# Patient Record
Sex: MALE
[De-identification: names, ages, dates, MRNs, and addresses within clinical notes are randomized per-mention and may not be internally consistent; named-entity substitution may affect disease eponyms.]

---

## 2017-09-20 ENCOUNTER — OTHER (OUTPATIENT)
Age: 67
End: 2017-09-20

## 2017-10-09 ENCOUNTER — LABORATORY RESULT (OUTPATIENT)
Age: 67
End: 2017-10-09

## 2017-10-10 ENCOUNTER — RX RENEWAL (OUTPATIENT)
Age: 67
End: 2017-10-10

## 2017-10-11 ENCOUNTER — CHART COPY (OUTPATIENT)
Age: 67
End: 2017-10-11

## 2017-10-11 LAB
ALBUMIN SERPL ELPH-MCNC: 4.1 G/DL
ALP BLD-CCNC: 50 U/L
ALT SERPL-CCNC: 27 U/L
ANION GAP SERPL CALC-SCNC: 17 MMOL/L
AST SERPL-CCNC: 22 U/L
BASOPHILS # BLD AUTO: 0 K/UL
BASOPHILS NFR BLD AUTO: 0 %
BILIRUB SERPL-MCNC: 0.4 MG/DL
BUN SERPL-MCNC: 20 MG/DL
CALCIUM SERPL-MCNC: 9.1 MG/DL
CGA SERPL-MCNC: 492 NG/ML
CHLORIDE SERPL-SCNC: 100 MMOL/L
CHOLEST SERPL-MCNC: 141 MG/DL
CHOLEST/HDLC SERPL: 3.4 RATIO
CO2 SERPL-SCNC: 26 MMOL/L
CREAT SERPL-MCNC: 1.13 MG/DL
EOSINOPHIL # BLD AUTO: 0.05 K/UL
EOSINOPHIL NFR BLD AUTO: 1 %
FERRITIN SERPL-MCNC: 8 NG/ML
FOLATE SERPL-MCNC: >20 NG/ML
GLUCOSE SERPL-MCNC: 110 MG/DL
HCT VFR BLD CALC: 38.2 %
HDLC SERPL-MCNC: 42 MG/DL
HGB BLD-MCNC: 11.7 G/DL
IRON SATN MFR SERPL: 6 %
IRON SERPL-MCNC: 22 UG/DL
LDLC SERPL CALC-MCNC: 75 MG/DL
LYMPHOCYTES # BLD AUTO: 2.01 K/UL
LYMPHOCYTES NFR BLD AUTO: 41 %
MAN DIFF?: NORMAL
MCHC RBC-ENTMCNC: 20.7 PG
MCHC RBC-ENTMCNC: 30.6 GM/DL
MCV RBC AUTO: 67.6 FL
MONOCYTES # BLD AUTO: 0.49 K/UL
MONOCYTES NFR BLD AUTO: 10 %
NEUTROPHILS # BLD AUTO: 2.36 K/UL
NEUTROPHILS NFR BLD AUTO: 48 %
PLATELET # BLD AUTO: 181 K/UL
POTASSIUM SERPL-SCNC: 4.2 MMOL/L
PROT SERPL-MCNC: 6.6 G/DL
RBC # BLD: 5.65 M/UL
RBC # FLD: 19.1 %
SODIUM SERPL-SCNC: 143 MMOL/L
TIBC SERPL-MCNC: 389 UG/DL
TRIGL SERPL-MCNC: 121 MG/DL
UIBC SERPL-MCNC: 367 UG/DL
VIT B12 SERPL-MCNC: 471 PG/ML
WBC # FLD AUTO: 4.91 K/UL

## 2017-11-20 ENCOUNTER — LABORATORY RESULT (OUTPATIENT)
Age: 67
End: 2017-11-20

## 2017-11-20 ENCOUNTER — APPOINTMENT (OUTPATIENT)
Dept: GASTROENTEROLOGY | Facility: CLINIC | Age: 67
End: 2017-11-20
Payer: MEDICARE

## 2017-11-20 VITALS
HEIGHT: 75 IN | BODY MASS INDEX: 31.46 KG/M2 | WEIGHT: 253 LBS | DIASTOLIC BLOOD PRESSURE: 76 MMHG | SYSTOLIC BLOOD PRESSURE: 116 MMHG | TEMPERATURE: 98.7 F

## 2017-11-20 PROCEDURE — 99213 OFFICE O/P EST LOW 20 MIN: CPT

## 2017-11-20 RX ORDER — CARVEDILOL 12.5 MG/1
12.5 TABLET, FILM COATED ORAL
Qty: 180 | Refills: 0 | Status: ACTIVE | COMMUNITY
Start: 2017-08-05

## 2017-11-20 RX ORDER — EPLERENONE 50 MG/1
50 TABLET, COATED ORAL
Qty: 90 | Refills: 0 | Status: ACTIVE | COMMUNITY
Start: 2017-07-14

## 2017-11-20 RX ORDER — SILODOSIN 8 MG/1
8 CAPSULE ORAL
Qty: 90 | Refills: 0 | Status: ACTIVE | COMMUNITY
Start: 2017-07-11

## 2017-11-21 LAB
ALBUMIN SERPL ELPH-MCNC: 4.4 G/DL
ALP BLD-CCNC: 52 U/L
ALT SERPL-CCNC: 34 U/L
BASOPHILS # BLD AUTO: 0.2 K/UL
BASOPHILS NFR BLD AUTO: 2.8 %
BUN SERPL-MCNC: 20 MG/DL
CHLORIDE SERPL-SCNC: 98 MMOL/L
CHOLEST SERPL-MCNC: 168 MG/DL
CO2 SERPL-SCNC: 27 MMOL/L
CREAT SERPL-MCNC: 1.15 MG/DL
ENDOMYSIUM IGA SER QL: NEGATIVE
ENDOMYSIUM IGA TITR SER: NORMAL
EOSINOPHIL # BLD AUTO: 0 K/UL
EOSINOPHIL NFR BLD AUTO: 0
FERRITIN SERPL-MCNC: 58 NG/ML
FOLATE SERPL-MCNC: >20 NG/ML
GLIADIN IGA SER QL: 5.5 UNITS
GLIADIN IGG SER QL: 13.1 UNITS
GLIADIN PEPTIDE IGA SER-ACNC: NEGATIVE
GLIADIN PEPTIDE IGG SER-ACNC: NEGATIVE
GLUCOSE SERPL-MCNC: 115 MG/DL
HCT VFR BLD CALC: 46.6 %
HDLC SERPL-MCNC: 37 MG/DL
HGB BLD-MCNC: 14.9 G/DL
IGA SER QL IEP: 233 MG/DL
IRON SATN MFR SERPL: 29 %
IRON SERPL-MCNC: 110 UG/DL
LDH SERPL-CCNC: 203 U/L
LDLC SERPL DIRECT ASSAY-MCNC: 90 MG/DL
LYMPHOCYTES # BLD AUTO: 2.13 K/UL
LYMPHOCYTES NFR BLD AUTO: 29.6 %
MAGNESIUM SERPL-MCNC: 1.9 MG/DL
MAN DIFF?: NORMAL
MCHC RBC-ENTMCNC: 24.6 PG
MCHC RBC-ENTMCNC: 32 GM/DL
MCV RBC AUTO: 77 FL
MONOCYTES # BLD AUTO: 0.4 K/UL
MONOCYTES NFR BLD AUTO: 5.6 %
NEUTROPHILS # BLD AUTO: 4.26 K/UL
NEUTROPHILS NFR BLD AUTO: 59.2 %
PHOSPHATE SERPL-MCNC: 3.6 MG/DL
PLATELET # BLD AUTO: 180 K/UL
POTASSIUM SERPL-SCNC: 3.7 MMOL/L
RBC # BLD: 6.05 M/UL
RBC # FLD: 25.1 %
SODIUM SERPL-SCNC: 139 MMOL/L
TIBC SERPL-MCNC: 384 UG/DL
TRIGL SERPL-MCNC: 286 MG/DL
TTG IGA SER IA-ACNC: 5 UNITS
TTG IGA SER-ACNC: NEGATIVE
TTG IGG SER IA-ACNC: <5 UNITS
TTG IGG SER IA-ACNC: NEGATIVE
UIBC SERPL-MCNC: 274 UG/DL
VIT B12 SERPL-MCNC: 571 PG/ML
WBC # FLD AUTO: 7.2 K/UL

## 2017-11-29 ENCOUNTER — CHART COPY (OUTPATIENT)
Age: 67
End: 2017-11-29

## 2017-11-29 LAB
CGA SERPL-MCNC: 546 NG/ML
SEROTONIN SERUM: 73 NG/ML

## 2018-10-10 ENCOUNTER — APPOINTMENT (OUTPATIENT)
Dept: GASTROENTEROLOGY | Facility: CLINIC | Age: 68
End: 2018-10-10
Payer: MEDICARE

## 2018-10-10 VITALS
BODY MASS INDEX: 30.46 KG/M2 | OXYGEN SATURATION: 97 % | HEART RATE: 84 BPM | DIASTOLIC BLOOD PRESSURE: 100 MMHG | TEMPERATURE: 98.4 F | WEIGHT: 245 LBS | HEIGHT: 75 IN | SYSTOLIC BLOOD PRESSURE: 140 MMHG

## 2018-10-10 DIAGNOSIS — D50.8 OTHER IRON DEFICIENCY ANEMIAS: ICD-10-CM

## 2018-10-10 PROCEDURE — 99214 OFFICE O/P EST MOD 30 MIN: CPT

## 2018-10-11 LAB
ALBUMIN SERPL ELPH-MCNC: 4.3 G/DL
ALP BLD-CCNC: 63 U/L
ALT SERPL-CCNC: 37 U/L
ANION GAP SERPL CALC-SCNC: 12 MMOL/L
AST SERPL-CCNC: 28 U/L
BILIRUB SERPL-MCNC: 0.6 MG/DL
BUN SERPL-MCNC: 18 MG/DL
CALCIUM SERPL-MCNC: 9.4 MG/DL
CHLORIDE SERPL-SCNC: 104 MMOL/L
CO2 SERPL-SCNC: 29 MMOL/L
CREAT SERPL-MCNC: 1.05 MG/DL
FERRITIN SERPL-MCNC: 127 NG/ML
FOLATE SERPL-MCNC: >20 NG/ML
GLUCOSE SERPL-MCNC: 120 MG/DL
IRON SATN MFR SERPL: 34 %
IRON SERPL-MCNC: 112 UG/DL
POTASSIUM SERPL-SCNC: 4.5 MMOL/L
PROT SERPL-MCNC: 7 G/DL
SODIUM SERPL-SCNC: 145 MMOL/L
TIBC SERPL-MCNC: 330 UG/DL
UIBC SERPL-MCNC: 218 UG/DL
VIT B12 SERPL-MCNC: 533 PG/ML

## 2018-10-12 LAB — CGA SERPL-MCNC: 170 NG/ML

## 2018-10-19 ENCOUNTER — APPOINTMENT (OUTPATIENT)
Dept: GASTROENTEROLOGY | Facility: CLINIC | Age: 68
End: 2018-10-19
Payer: MEDICARE

## 2018-10-19 PROCEDURE — 36415 COLL VENOUS BLD VENIPUNCTURE: CPT

## 2018-10-20 LAB
BASOPHILS # BLD AUTO: 0.03 K/UL
BASOPHILS NFR BLD AUTO: 0.6 %
EOSINOPHIL # BLD AUTO: 0.09 K/UL
EOSINOPHIL NFR BLD AUTO: 1.7 %
HCT VFR BLD CALC: 48.3 %
HGB BLD-MCNC: 17.1 G/DL
IMM GRANULOCYTES NFR BLD AUTO: 0.2 %
LYMPHOCYTES # BLD AUTO: 1.93 K/UL
LYMPHOCYTES NFR BLD AUTO: 35.7 %
MAN DIFF?: NORMAL
MCHC RBC-ENTMCNC: 32.2 PG
MCHC RBC-ENTMCNC: 35.4 GM/DL
MCV RBC AUTO: 91 FL
MONOCYTES # BLD AUTO: 0.73 K/UL
MONOCYTES NFR BLD AUTO: 13.5 %
NEUTROPHILS # BLD AUTO: 2.62 K/UL
NEUTROPHILS NFR BLD AUTO: 48.3 %
PLATELET # BLD AUTO: 145 K/UL
RBC # BLD: 5.31 M/UL
RBC # FLD: 13.7 %
WBC # FLD AUTO: 5.41 K/UL

## 2018-10-23 LAB — SEROTONIN SERUM: 95 NG/ML

## 2019-05-09 ENCOUNTER — MOBILE ON CALL (OUTPATIENT)
Age: 69
End: 2019-05-09

## 2019-06-19 ENCOUNTER — APPOINTMENT (OUTPATIENT)
Dept: GASTROENTEROLOGY | Facility: CLINIC | Age: 69
End: 2019-06-19
Payer: MEDICARE

## 2019-06-19 ENCOUNTER — LABORATORY RESULT (OUTPATIENT)
Age: 69
End: 2019-06-19

## 2019-06-19 VITALS
BODY MASS INDEX: 29.97 KG/M2 | SYSTOLIC BLOOD PRESSURE: 140 MMHG | OXYGEN SATURATION: 97 % | WEIGHT: 241 LBS | HEIGHT: 75 IN | TEMPERATURE: 98.1 F | HEART RATE: 67 BPM | DIASTOLIC BLOOD PRESSURE: 100 MMHG

## 2019-06-19 DIAGNOSIS — Z87.448 PERSONAL HISTORY OF OTHER DISEASES OF URINARY SYSTEM: ICD-10-CM

## 2019-06-19 DIAGNOSIS — R93.5 ABNORMAL FINDINGS ON DIAGNOSTIC IMAGING OF OTHER ABDOMINAL REGIONS, INCLUDING RETROPERITONEUM: ICD-10-CM

## 2019-06-19 DIAGNOSIS — Z85.46 PERSONAL HISTORY OF MALIGNANT NEOPLASM OF PROSTATE: ICD-10-CM

## 2019-06-19 DIAGNOSIS — Z86.39 PERSONAL HISTORY OF OTHER ENDOCRINE, NUTRITIONAL AND METABOLIC DISEASE: ICD-10-CM

## 2019-06-19 PROCEDURE — 99215 OFFICE O/P EST HI 40 MIN: CPT | Mod: 25

## 2019-06-19 PROCEDURE — 36415 COLL VENOUS BLD VENIPUNCTURE: CPT

## 2019-06-19 RX ORDER — ERGOCALCIFEROL 1.25 MG/1
1.25 MG CAPSULE ORAL
Refills: 0 | Status: ACTIVE | COMMUNITY

## 2019-06-19 RX ORDER — POTASSIUM CITRATE 10 MEQ/1
10 MEQ TABLET, EXTENDED RELEASE ORAL
Qty: 450 | Refills: 0 | Status: ACTIVE | COMMUNITY
Start: 2018-09-20

## 2019-06-19 RX ORDER — TELMISARTAN 20 MG/1
20 TABLET ORAL
Refills: 0 | Status: DISCONTINUED | COMMUNITY
End: 2019-06-19

## 2019-06-19 RX ORDER — CHLORHEXIDINE GLUCONATE 4 %
325 (65 FE) LIQUID (ML) TOPICAL TWICE DAILY
Refills: 0 | Status: DISCONTINUED | COMMUNITY
Start: 2017-11-20 | End: 2019-06-19

## 2019-06-19 RX ORDER — MULTIVIT-MIN/FA/LYCOPEN/LUTEIN .4-300-25
TABLET ORAL
Refills: 0 | Status: ACTIVE | COMMUNITY

## 2019-06-19 RX ORDER — RANITIDINE 300 MG/1
300 TABLET ORAL
Qty: 90 | Refills: 2 | Status: DISCONTINUED | COMMUNITY
Start: 2017-10-10 | End: 2019-06-19

## 2019-06-19 RX ORDER — TELMISARTAN 40 MG/1
40 TABLET ORAL
Qty: 90 | Refills: 0 | Status: ACTIVE | COMMUNITY
Start: 2018-11-19

## 2019-06-19 NOTE — PHYSICAL EXAM
[General Appearance - Alert] : alert [Neck Appearance] : the appearance of the neck was normal [General Appearance - In No Acute Distress] : in no acute distress [Neck Cervical Mass (___cm)] : no neck mass was observed [Jugular Venous Distention Increased] : there was no jugular-venous distention [Thyroid Nodule] : there were no palpable thyroid nodules [Thyroid Diffuse Enlargement] : the thyroid was not enlarged [Auscultation Breath Sounds / Voice Sounds] : lungs were clear to auscultation bilaterally [Bowel Sounds] : normal bowel sounds [Abdomen Tenderness] : non-tender [Abdomen Soft] : soft [] : no hepato-splenomegaly [Abdomen Mass (___ Cm)] : no abdominal mass palpated [No Spinal Tenderness] : no spinal tenderness [No CVA Tenderness] : no ~M costovertebral angle tenderness [Impaired Insight] : insight and judgment were intact [Oriented To Time, Place, And Person] : oriented to person, place, and time [Affect] : the affect was normal

## 2019-06-20 NOTE — ASSESSMENT
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 A recent CT had changes that could be seen in early cirrhosis along with very small liver hypodensities, a splenic lesion that is most likely benign, and a peritoneal nodule for which early peritoneal disease cannot be excluded.\par \par Patient was sent for an MRI of the abdomen to further evaluate the above findings.\par \par Patient was sent for a fibroscan test to evaluate for the presence of cirrhosis.\par \par Blood work was sent for LFTs, PT, alpha-1 antitrypsin, alpha-fetoprotein, PATRIA, ANCA, ceruloplasmin, iron studies, GGTP, celiac markers, hepatitis A., B., C. serologies, lipid profile, AMA, anti-smooth muscle antibody, anti-LKM antibody, anti-soluble liver antigen antibody, CBC.

## 2019-06-20 NOTE — CONSULT LETTER
[FreeTextEntry1] : Dear Dr. Pop Astudillo,\par \par I had the pleasure of seeing your patient IKE ROYAL in the office today.  My office note is attached.\par \par Thank you very much for allowing me to participate in the care of your patient.\par \par Sincerely,\par \par Harsha Rosenberg M.D., FACG, FACP\par Director, Celiac Program at [facility]  of Medicine\par [employer] at [facility] Practice Director,\par [facility] - Gastroenterology/Internal Medicine at [place]\[facility] [address]\[place] [address]\par Tel: [phone]\[facility] Email: [email or web address] \par \[facility] The attached note has been created using a voice recognition system (Dragon).  There may be some misspellings and typos.  Please call my office if you have any issues or questions.

## 2019-06-20 NOTE — HISTORY OF PRESENT ILLNESS
[FreeTextEntry1] : The patient was a patient of Dr. Tejada, who has retired.  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 He has a history of prostate cancer which was treated with radiation and hormonal treatment followed by cryoablation. The patient now has recurrent prostate cancer and repeat salvage cryoablation is planned for the end of August. The patient had a CT scan of the abdomen and pelvis on June 8, 2019. The liver had a slightly lobulated contour that may be seen in early cirrhosis along with hepatic hypodensities that were too small to characterize. A splenic lesion was seen which appeared benign. Additionally, a left anterior pelvic 1.3 cm peritoneal nodule was seen for which early peritoneal disease could not be excluded.\par \par The patient is on pantoprazole b.i.d. for chronic reflux and denies abdominal pain, heartburn, dysphagia. He has a good appetite. He has 2-3 bowel movements a day which range from solid to loose. He denies melena or prior blood per rectum. He has lost 15-20 pounds over the past year intentionally. He denies any alcohol use.

## 2019-06-21 ENCOUNTER — OTHER (OUTPATIENT)
Age: 69
End: 2019-06-21

## 2019-06-21 PROBLEM — K64.4 EXTERNAL HEMORRHOID: Status: ACTIVE | Noted: 2019-06-21

## 2019-06-21 RX ORDER — HYDROCORTISONE 2.5% 25 MG/G
2.5 CREAM TOPICAL
Qty: 1 | Refills: 0 | Status: ACTIVE | COMMUNITY
Start: 2019-06-21 | End: 1900-01-01

## 2019-06-24 ENCOUNTER — APPOINTMENT (OUTPATIENT)
Dept: HEPATOLOGY | Facility: CLINIC | Age: 69
End: 2019-06-24
Payer: MEDICARE

## 2019-06-24 ENCOUNTER — TRANSCRIPTION ENCOUNTER (OUTPATIENT)
Age: 69
End: 2019-06-24

## 2019-06-24 DIAGNOSIS — K64.4 RESIDUAL HEMORRHOIDAL SKIN TAGS: ICD-10-CM

## 2019-06-24 PROCEDURE — 91200 LIVER ELASTOGRAPHY: CPT

## 2019-06-26 LAB
A1AT SERPL-MCNC: 124 MG/DL
AFP-TM SERPL-MCNC: 2.5 NG/ML
ALBUMIN SERPL ELPH-MCNC: 4.3 G/DL
ALP BLD-CCNC: 60 U/L
ALT SERPL-CCNC: 31 U/L
ANA SER IF-ACNC: NEGATIVE
AST SERPL-CCNC: 24 U/L
BASOPHILS # BLD AUTO: 0.05 K/UL
BASOPHILS NFR BLD AUTO: 0.8 %
BILIRUB DIRECT SERPL-MCNC: 0.2 MG/DL
BILIRUB INDIRECT SERPL-MCNC: 0.4 MG/DL
BILIRUB SERPL-MCNC: 0.6 MG/DL
CERULOPLASMIN SERPL-MCNC: 18 MG/DL
CHOLEST SERPL-MCNC: 172 MG/DL
CHOLEST/HDLC SERPL: 4.7 RATIO
EOSINOPHIL # BLD AUTO: 0.08 K/UL
EOSINOPHIL NFR BLD AUTO: 1.2 %
FERRITIN SERPL-MCNC: 80 NG/ML
GGT SERPL-CCNC: 41 U/L
GLIADIN IGA SER QL: 6.4 UNITS
GLIADIN IGG SER QL: 9 UNITS
GLIADIN PEPTIDE IGA SER-ACNC: NEGATIVE
GLIADIN PEPTIDE IGG SER-ACNC: NEGATIVE
HBV CORE IGG+IGM SER QL: NONREACTIVE
HBV SURFACE AB SER QL: NONREACTIVE
HBV SURFACE AG SER QL: NONREACTIVE
HCT VFR BLD CALC: 50.3 %
HCV AB SER QL: NONREACTIVE
HCV S/CO RATIO: 0.11 S/CO
HDLC SERPL-MCNC: 37 MG/DL
HEPATITIS A IGG ANTIBODY: NONREACTIVE
HGB BLD-MCNC: 16.5 G/DL
IGA SER QL IEP: 236 MG/DL
IGG SER QL IEP: 825 MG/DL
IMM GRANULOCYTES NFR BLD AUTO: 0.3 %
INR PPP: 1 RATIO
IRON SATN MFR SERPL: 24 %
IRON SERPL-MCNC: 84 UG/DL
LDLC SERPL CALC-MCNC: 78 MG/DL
LKM AB SER QL IF: <20.1 UNITS
LYMPHOCYTES # BLD AUTO: 2.35 K/UL
LYMPHOCYTES NFR BLD AUTO: 36.2 %
MAN DIFF?: NORMAL
MCHC RBC-ENTMCNC: 30.3 PG
MCHC RBC-ENTMCNC: 32.8 GM/DL
MCV RBC AUTO: 92.3 FL
MITOCHONDRIA AB SER IF-ACNC: NORMAL
MONOCYTES # BLD AUTO: 0.76 K/UL
MONOCYTES NFR BLD AUTO: 11.7 %
MPO AB + PR3 PNL SER: NORMAL
NEUTROPHILS # BLD AUTO: 3.24 K/UL
NEUTROPHILS NFR BLD AUTO: 49.8 %
PLATELET # BLD AUTO: 170 K/UL
PROT SERPL-MCNC: 6.6 G/DL
PT BLD: 11.4 SEC
RBC # BLD: 5.45 M/UL
RBC # FLD: 13.2 %
SMOOTH MUSCLE AB SER QL IF: NORMAL
SOLUBLE LIVER IGG SER IA-ACNC: < 20.1 UNITS
TIBC SERPL-MCNC: 351 UG/DL
TRIGL SERPL-MCNC: 283 MG/DL
TTG IGA SER IA-ACNC: <1.2 U/ML
TTG IGA SER-ACNC: NEGATIVE
TTG IGG SER IA-ACNC: 1.2 U/ML
TTG IGG SER IA-ACNC: NEGATIVE
UIBC SERPL-MCNC: 267 UG/DL
WBC # FLD AUTO: 6.5 K/UL

## 2019-07-10 ENCOUNTER — RESULT REVIEW (OUTPATIENT)
Age: 69
End: 2019-07-10

## 2019-07-14 ENCOUNTER — MESSAGE (OUTPATIENT)
Age: 69
End: 2019-07-14

## 2019-07-14 DIAGNOSIS — Z87.19 PERSONAL HISTORY OF OTHER DISEASES OF THE DIGESTIVE SYSTEM: ICD-10-CM

## 2019-07-23 ENCOUNTER — APPOINTMENT (OUTPATIENT)
Dept: GASTROENTEROLOGY | Facility: CLINIC | Age: 69
End: 2019-07-23

## 2019-09-28 ENCOUNTER — LABORATORY RESULT (OUTPATIENT)
Age: 69
End: 2019-09-28

## 2019-10-10 ENCOUNTER — APPOINTMENT (OUTPATIENT)
Dept: GASTROENTEROLOGY | Facility: CLINIC | Age: 69
End: 2019-10-10
Payer: MEDICARE

## 2019-10-10 VITALS
WEIGHT: 240 LBS | HEART RATE: 80 BPM | BODY MASS INDEX: 29.84 KG/M2 | HEIGHT: 75 IN | DIASTOLIC BLOOD PRESSURE: 80 MMHG | SYSTOLIC BLOOD PRESSURE: 120 MMHG | TEMPERATURE: 98.8 F | OXYGEN SATURATION: 97 %

## 2019-10-10 DIAGNOSIS — D3A.010 BENIGN CARCINOID TUMOR OF THE DUODENUM: ICD-10-CM

## 2019-10-10 DIAGNOSIS — K74.0 HEPATIC FIBROSIS: ICD-10-CM

## 2019-10-10 DIAGNOSIS — R19.7 DIARRHEA, UNSPECIFIED: ICD-10-CM

## 2019-10-10 PROCEDURE — 99214 OFFICE O/P EST MOD 30 MIN: CPT

## 2019-10-10 NOTE — PHYSICAL EXAM
[General Appearance - Alert] : alert [General Appearance - In No Acute Distress] : in no acute distress [Neck Appearance] : the appearance of the neck was normal [Neck Cervical Mass (___cm)] : no neck mass was observed [Jugular Venous Distention Increased] : there was no jugular-venous distention [Thyroid Diffuse Enlargement] : the thyroid was not enlarged [Thyroid Nodule] : there were no palpable thyroid nodules [Bowel Sounds] : normal bowel sounds [Auscultation Breath Sounds / Voice Sounds] : lungs were clear to auscultation bilaterally [Abdomen Tenderness] : non-tender [Abdomen Soft] : soft [Abdomen Mass (___ Cm)] : no abdominal mass palpated [] : no hepato-splenomegaly [No CVA Tenderness] : no ~M costovertebral angle tenderness [No Spinal Tenderness] : no spinal tenderness [Oriented To Time, Place, And Person] : oriented to person, place, and time [Impaired Insight] : insight and judgment were intact [Affect] : the affect was normal [Heart Rate And Rhythm] : heart rate was normal and rhythm regular [Heart Sounds] : normal S1 and S2 [Heart Sounds Gallop] : no gallops [Murmurs] : no murmurs [Heart Sounds Pericardial Friction Rub] : no pericardial rub [Edema] : there was no peripheral edema

## 2019-10-10 NOTE — ASSESSMENT
[FreeTextEntry1] : Patient with a history of duodenal carcinoid.  Work-up seems to show that the patient does not have cirrhosis but does have evidence of hepatic fibrosis of unclear etiology.  Work-up is otherwise been negative and LFTs have been normal.  There are subcentimeter cystic pancreatic lesion seen on MRI.  The patient has intermittent diarrhea.\par \par The patient is due for EGD and colonoscopy but we will hold off on these procedures as he is recovering from his prostate procedure.  The patient also informs me that he has had difficulty with previous endoscopic procedures.  I will discuss this with Dr. Tejada who has retired to find out more information.  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 A recent CT had changes that could be seen in early cirrhosis along with very small liver hypodensities, a splenic lesion that is most likely benign, and a peritoneal nodule for which early peritoneal disease cannot be excluded.\par \par Patient was sent for an MRI of the abdomen to further evaluate the above findings.\par \par Patient was sent for a fibroscan test to evaluate for the presence of cirrhosis.\par \par Blood work was sent for LFTs, PT, alpha-1 antitrypsin, alpha-fetoprotein, PATRIA, ANCA, ceruloplasmin, iron studies, GGTP, celiac markers, hepatitis A., B., C. serologies, lipid profile, AMA, anti-smooth muscle antibody, anti-LKM antibody, anti-soluble liver antigen antibody, CBC.

## 2019-10-10 NOTE — HISTORY OF PRESENT ILLNESS
[FreeTextEntry1] : The patient has a history of a duodenal carcinoid.  We reviewed the evaluations done since the patient's last visit on June 19, 2019.  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  A fibro-scan done on June 24, 2019 revealed F2 disease with no evidence of fat.  Blood work was normal on September 28, 2019.\par \par The patient underwent salvage cryoablation of the prostate on August 29.  He is still recovering from this procedure.  He otherwise feels well denying heartburn, dysphasia, or abdominal pain.  He gets intermittent diarrhea about 1-2 times a week with urgency.  Otherwise he has 1-2 bowel movements a day which are usually solid.  He denies melena or bright red blood per rectum.\par \par \par Note from 6/19/2019 - The patient was a patient of Dr. Tejada, who has retired.  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 He has a history of prostate cancer which was treated with radiation and hormonal treatment followed by cryoablation. The patient now has recurrent prostate cancer and repeat salvage cryoablation is planned for the end of August. The patient had a CT scan of the abdomen and pelvis on June 8, 2019. The liver had a slightly lobulated contour that may be seen in early cirrhosis along with hepatic hypodensities that were too small to characterize. A splenic lesion was seen which appeared benign. Additionally, a left anterior pelvic 1.3 cm peritoneal nodule was seen for which early peritoneal disease could not be excluded.\par \par The patient is on pantoprazole b.i.d. for chronic reflux and denies abdominal pain, heartburn, dysphagia. He has a good appetite. He has 2-3 bowel movements a day which range from solid to loose. He denies melena or prior blood per rectum. He has lost 15-20 pounds over the past year intentionally. He denies any alcohol use.

## 2019-10-10 NOTE — CONSULT LETTER
[FreeTextEntry1] : Dear Dr. Pop Astudillo,\par \par I had the pleasure of seeing your patient IKE ROYAL in the office today.  My office note is attached.\par \par Thank you very much for allowing me to participate in the care of your patient.\par \par Sincerely,\par \par Harsha Rosenberg M.D., FACG, FACP\par Director, Celiac Program at [facility]  of Medicine\par [employer] at [facility] Practice Director,\par [facility] - Gastroenterology/Internal Medicine at [place]\[facility] [address]\[place] [address]\par Tel: [phone]\[facility] Email: [email or web address]\par \par \[facility] The attached note has been created using a voice recognition system (Dragon).  There may be some misspellings and typos.  Please call my office if you have any issues or questions.

## 2019-10-11 ENCOUNTER — OTHER (OUTPATIENT)
Age: 69
End: 2019-10-11

## 2019-12-10 ENCOUNTER — RX RENEWAL (OUTPATIENT)
Age: 69
End: 2019-12-10

## 2020-01-01 ENCOUNTER — APPOINTMENT (OUTPATIENT)
Dept: GASTROENTEROLOGY | Facility: CLINIC | Age: 70
End: 2020-01-01
Payer: MEDICARE

## 2020-01-01 ENCOUNTER — RX RENEWAL (OUTPATIENT)
Age: 70
End: 2020-01-01

## 2020-01-01 DIAGNOSIS — D3A.00 BENIGN CARCINOID TUMOR OF UNSPECIFIED SITE: ICD-10-CM

## 2020-01-01 DIAGNOSIS — K21.9 GASTRO-ESOPHAGEAL REFLUX DISEASE W/OUT ESOPHAGITIS: ICD-10-CM

## 2020-01-01 DIAGNOSIS — K86.2 CYST OF PANCREAS: ICD-10-CM

## 2020-01-01 PROCEDURE — 99443: CPT | Mod: 95

## 2020-10-21 PROBLEM — D3A.00 CARCINOID TUMOR: Status: ACTIVE | Noted: 2017-09-20

## 2020-10-21 PROBLEM — K21.9 GASTROESOPHAGEAL REFLUX DISEASE: Status: ACTIVE | Noted: 2017-10-10

## 2020-10-21 PROBLEM — K86.2 PANCREAS CYST: Status: ACTIVE | Noted: 2020-01-01

## 2020-10-22 NOTE — CONSULT LETTER
[FreeTextEntry1] : Dear Dr. Pop Astudillo,\[facility] \[facility] I had the pleasure of seeing your patient IKE ROYAL in the office today.  My office note is attached. PLEASE READ THE "ASSESSMENT" SECTION OF THE NOTE TO SEE MY IMPRESSION AND PLAN.\par \[facility] Thank you very much for allowing me to participate in the care of your patient.\[facility] \[facility] Sincerely,\[facility] \[facility] Harsha Rosenberg M.D., FAC, [facility] Director, Celiac Program at [facility]  of Medicine\[facility] [employer] at \[facility]\""/[facility] Practice Director,[facility] - Gastroenterology/Internal Medicine at [place]\[facility] [address]\[place], [place] [address] Tel: [phone]\[facility] Email: [email or web address] \[facility] \[facility] The attached note has been created using a voice recognition system (Dragon).  There may be some misspellings and typos.  Please call my office if you have any issues or questions.

## 2020-10-22 NOTE — REASON FOR VISIT
[Spouse] : spouse [FreeTextEntry1] : History of duodenal carcinoid tumor, GERD, pancreatic cystic lesions

## 2020-10-22 NOTE — ASSESSMENT
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  There was also a nonspecific abnormal area of mucosa on the previous capsule endoscopy in the jejunum.\par \par I have advised the patient to see Dr. Friedel for further evaluation.  An endoscopy with endoscopic ultrasound would serve to best evaluate for recurrence of duodenal carcinoid and to evaluate the pancreatic lesions.  I will leave it to Dr. Friedel to decide on the need for a repeat capsule endoscopy and/or enteroscopy to evaluate the jejunum.\par \par Patient will require a colonoscopy at some point but we will hold off for now as the patient is still undergoing treatment for the prostate cancer.\par \par Patient will continue pantoprazole.\par \par \par Plan from 10/10/2019 - Patient with a history of duodenal carcinoid.  Work-up seems to show that the patient does not have cirrhosis but does have evidence of hepatic fibrosis of unclear etiology.  Work-up is otherwise been negative and LFTs have been normal.  There are subcentimeter cystic pancreatic lesion seen on MRI.  The patient has intermittent diarrhea.\par \par The patient is due for EGD and colonoscopy but we will hold off on these procedures as he is recovering from his prostate procedure.  The patient also informs me that he has had difficulty with previous endoscopic procedures.  I will discuss this with Dr. Tejada who has retired to find out more information.  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 A recent CT had changes that could be seen in early cirrhosis along with very small liver hypodensities, a splenic lesion that is most likely benign, and a peritoneal nodule for which early peritoneal disease cannot be excluded.\par \par Patient was sent for an MRI of the abdomen to further evaluate the above findings.\par \par Patient was sent for a fibroscan test to evaluate for the presence of cirrhosis.\par \par Blood work was sent for LFTs, PT, alpha-1 antitrypsin, alpha-fetoprotein, PATRIA, ANCA, ceruloplasmin, iron studies, GGTP, celiac markers, hepatitis A., B., C. serologies, lipid profile, AMA, anti-smooth muscle antibody, anti-LKM antibody, anti-soluble liver antigen antibody, CBC.

## 2020-10-22 NOTE — HISTORY OF PRESENT ILLNESS
[Home] : at home, [unfilled] , at the time of the visit. [Medical Office: ([facility])___] : at the medical office located in  [Spouse] : spouse [Verbal consent obtained from patient] : the patient, [unfilled] [FreeTextEntry1] : This was a telephonic visit.  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  The patient is currently undergoing chemotherapy for stage IV prostate cancer with bone metastases.  He also had COVID-19 in March but was not hospitalized and recovered.  He has been admitted for kidney stones in January, February, and June of this year.\par \par The patient has been on pantoprazole 40 mg a day and currently denies abdominal pain, heartburn, dysphagia.  He has 1 solid bowel movement a day without melena or bright red blood per rectum.  The patient has lost 40 pounds over the past 6 months which he states is intentional but also related to the chemotherapy.\par \par Last year, I discussed the patient's situation with his previous gastroenterologist Dr. Tejada.  The patient's last endoscopic procedure was an endoscopy, polypectomy, and endoscopic ultrasound performed by Dr. Friedel in May 2017.  Prior to that he had a capsule endoscopy, also read by Dr. Friedel, which showed abnormal mucosa in the jejunum which was felt to be nonspecific with no mass lesion.  The patient had airway issues related to sleep apnea during the previous endoscopy.\par \par \par Note from 10/10/2019 - The patient has a history of a duodenal carcinoid.  We reviewed the evaluations done since the patient's last visit on June 19, 2019.  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  A fibro-scan done on June 24, 2019 revealed F2 disease with no evidence of fat.  Blood work was normal on September 28, 2019.\par \par The patient underwent salvage cryoablation of the prostate on August 29.  He is still recovering from this procedure.  He otherwise feels well denying heartburn, dysphasia, or abdominal pain.  He gets intermittent diarrhea about 1-2 times a week with urgency.  Otherwise he has 1-2 bowel movements a day which are usually solid.  He denies melena or bright red blood per rectum.\par \par \par Note from 6/19/2019 - The patient was a patient of Dr. Tejada, who has retired.  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 He has a history of prostate cancer which was treated with radiation and hormonal treatment followed by cryoablation. The patient now has recurrent prostate cancer and repeat salvage cryoablation is planned for the end of August. The patient had a CT scan of the abdomen and pelvis on June 8, 2019. The liver had a slightly lobulated contour that may be seen in early cirrhosis along with hepatic hypodensities that were too small to characterize. A splenic lesion was seen which appeared benign. Additionally, a left anterior pelvic 1.3 cm peritoneal nodule was seen for which early peritoneal disease could not be excluded.\par \par The patient is on pantoprazole b.i.d. for chronic reflux and denies abdominal pain, heartburn, dysphagia. He has a good appetite. He has 2-3 bowel movements a day which range from solid to loose. He denies melena or prior blood per rectum. He has lost 15-20 pounds over the past year intentionally. He denies any alcohol use.

## 2021-01-01 ENCOUNTER — NON-APPOINTMENT (OUTPATIENT)
Age: 71
End: 2021-01-01

## 2021-01-01 RX ORDER — PANTOPRAZOLE 40 MG/1
40 TABLET, DELAYED RELEASE ORAL
Qty: 180 | Refills: 1 | Status: ACTIVE | COMMUNITY
Start: 2017-11-20 | End: 1900-01-01